# Patient Record
Sex: FEMALE | Race: WHITE | NOT HISPANIC OR LATINO
[De-identification: names, ages, dates, MRNs, and addresses within clinical notes are randomized per-mention and may not be internally consistent; named-entity substitution may affect disease eponyms.]

---

## 2018-08-27 ENCOUNTER — CONSULTATION (OUTPATIENT)
Age: 63
End: 2018-08-27

## 2018-08-27 ASSESSMENT — TONOMETRY
OD_IOP_MMHG: 16
OS_IOP_MMHG: 15

## 2018-08-27 ASSESSMENT — VISUAL ACUITY
OS_PH: 20/40
OD_PH: 20/30-2
OD_SC: 20/60-2
OS_SC: 20/60

## 2018-10-10 NOTE — PATIENT DISCUSSION
Poorly functioning levator muscle left upper eyelid. Patient denies congenital ptosis. Patient and daughter aware of possible post-operative exophthalmos and exposure. Patient to proceed with surgery at her convenience.

## 2018-10-10 NOTE — PATIENT DISCUSSION
[facility] Visual Field 36 point screen: I have reviewed the visual fields both taped and untaped on this patient which demonstrate significant obstruction of the patient's peripheral visual field on the left eye.

## 2018-10-10 NOTE — PATIENT DISCUSSION
PHOTOGRAPHS: I have reviewed the external ocular photographs of this patient which show the following: significant ptosis of the left upper eyelid.

## 2018-12-13 NOTE — PATIENT DISCUSSION
Also, please do not hesitate to call us if you have any concerns not addressed by this information. Please call [phone] and we will do everything we can to help you during this period.

## 2019-02-14 NOTE — PATIENT DISCUSSION
Discussed option of sling procedure to left upper eyelid with the patient today. The patient will discuss with her family and follow up with  with one of her family members to discuss proceeding with the procedure.

## 2020-01-24 NOTE — PATIENT DISCUSSION
PHOTOGRAPHS: I have reviewed the external ocular photographs of this patient which show the following: significant entropion of the left lower eyelid.

## 2020-01-24 NOTE — PATIENT DISCUSSION
CICATRICIAL ENTROPION, LLL: RECOMMEND ENTROPION REPAIR W/ EXCISION OF ANTERIOR LAMELLA AND TIGHTENING OF LATERAL CANTHUS, LLL.

## 2020-02-25 NOTE — PATIENT DISCUSSION
Also, please do not hesitate to call us if you have any concerns not addressed by this information. Please call [phone] and we will do everything we can to help you during this period.

## 2021-10-18 ENCOUNTER — PREPPED CHART (OUTPATIENT)
Dept: URBAN - METROPOLITAN AREA CLINIC 21 | Facility: CLINIC | Age: 66
End: 2021-10-18

## 2021-10-18 NOTE — PATIENT DISCUSSION
Start: * OMEGA 3'S HPD DAILY /* OLOPATADINE ONE DROP TO BOTH EYES DAILY / * RETAINE ONE DROP TO EACH EYE 2 TIMES PER DAY / * OASIS TEARS ONE DROP TO EACH EYE 2 TIMES A DAY IF NEEDED/ * LID SCRUBS USE DAILY IN BOTH EYES/ * WARM COMPRESSES DAILY BEFORE LID SCRUBS.

## 2022-05-09 ENCOUNTER — FOLLOW UP (OUTPATIENT)
Dept: URBAN - METROPOLITAN AREA CLINIC 21 | Facility: CLINIC | Age: 67
End: 2022-05-09

## 2022-05-09 DIAGNOSIS — H35.373: ICD-10-CM

## 2022-05-09 DIAGNOSIS — H16.223: ICD-10-CM

## 2022-05-09 DIAGNOSIS — D49.2: ICD-10-CM

## 2022-05-09 PROCEDURE — 92014 COMPRE OPH EXAM EST PT 1/>: CPT

## 2022-05-09 PROCEDURE — 92134 CPTRZ OPH DX IMG PST SGM RTA: CPT

## 2022-05-09 PROCEDURE — 92285 EXTERNAL OCULAR PHOTOGRAPHY: CPT

## 2022-05-09 ASSESSMENT — TONOMETRY
OD_IOP_MMHG: 11
OS_IOP_MMHG: 11

## 2022-05-09 ASSESSMENT — KERATOMETRY
OS_K2POWER_DIOPTERS: 44.75
OD_AXISANGLE_DEGREES: 14
OD_K2POWER_DIOPTERS: 44.00
OD_AXISANGLE2_DEGREES: 104
OS_AXISANGLE2_DEGREES: 53
OS_K1POWER_DIOPTERS: 44.25
OS_AXISANGLE_DEGREES: 143
OD_K1POWER_DIOPTERS: 43.50

## 2022-05-09 ASSESSMENT — VISUAL ACUITY
OS_SC: 20/20
OD_SC: 20/20
OU_SC: J1

## 2022-05-23 ENCOUNTER — ESTABLISHED PATIENT (OUTPATIENT)
Dept: URBAN - METROPOLITAN AREA CLINIC 21 | Facility: CLINIC | Age: 67
End: 2022-05-23

## 2022-05-23 DIAGNOSIS — H16.223: ICD-10-CM

## 2022-05-23 DIAGNOSIS — D49.2: ICD-10-CM

## 2022-05-23 DIAGNOSIS — H35.373: ICD-10-CM

## 2022-05-23 PROCEDURE — 11102 TANGNTL BX SKIN SINGLE LES: CPT

## 2022-05-23 PROCEDURE — 99213 OFFICE O/P EST LOW 20 MIN: CPT

## 2022-05-23 ASSESSMENT — TONOMETRY
OD_IOP_MMHG: 12
OS_IOP_MMHG: 11

## 2022-05-23 ASSESSMENT — KERATOMETRY
OS_K2POWER_DIOPTERS: 44.75
OS_AXISANGLE2_DEGREES: 53
OS_K1POWER_DIOPTERS: 44.25
OS_AXISANGLE_DEGREES: 143
OD_AXISANGLE_DEGREES: 14
OD_AXISANGLE2_DEGREES: 104
OD_K2POWER_DIOPTERS: 44.00
OD_K1POWER_DIOPTERS: 43.50

## 2022-05-23 ASSESSMENT — VISUAL ACUITY
OD_SC: 20/20-1
OS_SC: 20/20-2

## 2022-05-23 NOTE — PROCEDURE NOTE: CLINICAL
PROCEDURE NOTE: Tangential Biopsy of Skin, Single Lesion Right Lower Lid. Diagnosis: Eyelid Lesion, Uncertain Behavior. Prep: Alcohol Prep/Wipe. Prior to treatment, the risks/benefits/alternatives were discussed. The patient wished to proceed with procedure. A time out to confirm the patient, site, and procedure has been achieved. The site has been marked. Lesion was excised using a dermablade and placed in formaline to be sent for histopathology. The wound was cauterized to achieve hemostasis. Erythromycin ointment was placed on the wound. Patient tolerated procedure well. There were no complications. Post procedure instructions were given. RLL- SENT TO PATH.

## 2022-05-23 NOTE — PATIENT DISCUSSION
The risks, benefits, and alternatives to surgery were discussed. The patient elects to proceed with surgery- SENT TO PATH.

## 2022-06-16 ENCOUNTER — ESTABLISHED PATIENT (OUTPATIENT)
Dept: URBAN - METROPOLITAN AREA CLINIC 21 | Facility: CLINIC | Age: 67
End: 2022-06-16

## 2022-06-16 DIAGNOSIS — H16.223: ICD-10-CM

## 2022-06-16 DIAGNOSIS — H35.373: ICD-10-CM

## 2022-06-16 PROCEDURE — 92285 EXTERNAL OCULAR PHOTOGRAPHY: CPT

## 2022-06-16 PROCEDURE — 99213 OFFICE O/P EST LOW 20 MIN: CPT

## 2022-06-16 ASSESSMENT — KERATOMETRY
OD_AXISANGLE_DEGREES: 14
OS_AXISANGLE_DEGREES: 143
OS_K1POWER_DIOPTERS: 44.25
OD_K1POWER_DIOPTERS: 43.50
OD_AXISANGLE2_DEGREES: 104
OS_K2POWER_DIOPTERS: 44.75
OS_AXISANGLE2_DEGREES: 53
OD_K2POWER_DIOPTERS: 44.00

## 2022-06-16 ASSESSMENT — TONOMETRY
OS_IOP_MMHG: 8
OD_IOP_MMHG: 9

## 2022-06-16 ASSESSMENT — VISUAL ACUITY
OS_SC: 20/25-2
OD_SC: 20/20-2

## 2022-06-16 NOTE — PATIENT DISCUSSION
PATH RESULT:   SKIN, RIGHT LOWER EYELID, EXCISION: MACULAR SEBORRHEIC KERATOSIS, EXTENDING TO THE EDGE OF THE SPECIMEN.

## 2023-07-11 ENCOUNTER — ESTABLISHED PATIENT (OUTPATIENT)
Dept: URBAN - METROPOLITAN AREA CLINIC 21 | Facility: CLINIC | Age: 68
End: 2023-07-11

## 2023-07-11 DIAGNOSIS — H16.223: ICD-10-CM

## 2023-07-11 PROCEDURE — 99212 OFFICE O/P EST SF 10 MIN: CPT

## 2023-07-11 ASSESSMENT — KERATOMETRY
OD_K2POWER_DIOPTERS: 44.00
OD_AXISANGLE_DEGREES: 14
OS_K2POWER_DIOPTERS: 44.75
OS_AXISANGLE_DEGREES: 143
OD_AXISANGLE2_DEGREES: 104
OS_K1POWER_DIOPTERS: 44.25
OD_K1POWER_DIOPTERS: 43.50
OS_AXISANGLE2_DEGREES: 53

## 2023-07-11 ASSESSMENT — VISUAL ACUITY
OS_SC: 20/25
OD_SC: 20/25

## 2023-07-11 ASSESSMENT — TONOMETRY
OD_IOP_MMHG: 10
OS_IOP_MMHG: 10

## 2023-10-31 ENCOUNTER — ESTABLISHED PATIENT (OUTPATIENT)
Dept: URBAN - METROPOLITAN AREA CLINIC 21 | Facility: CLINIC | Age: 68
End: 2023-10-31

## 2023-10-31 DIAGNOSIS — H35.373: ICD-10-CM

## 2023-10-31 PROCEDURE — 92134 CPTRZ OPH DX IMG PST SGM RTA: CPT

## 2023-10-31 PROCEDURE — 99214 OFFICE O/P EST MOD 30 MIN: CPT

## 2023-10-31 ASSESSMENT — KERATOMETRY
OS_AXISANGLE2_DEGREES: 53
OS_K2POWER_DIOPTERS: 44.75
OD_AXISANGLE_DEGREES: 14
OD_K1POWER_DIOPTERS: 43.50
OD_AXISANGLE2_DEGREES: 104
OS_AXISANGLE_DEGREES: 143
OD_K2POWER_DIOPTERS: 44.00
OS_K1POWER_DIOPTERS: 44.25

## 2023-10-31 ASSESSMENT — TONOMETRY
OS_IOP_MMHG: 9
OD_IOP_MMHG: 11

## 2023-10-31 ASSESSMENT — VISUAL ACUITY
OD_SC: 20/20-2
OU_SC: 20/20-1
OS_SC: 20/20-1

## 2025-01-08 ENCOUNTER — TELEPHONE ENCOUNTER (OUTPATIENT)
Dept: URBAN - METROPOLITAN AREA CLINIC 113 | Facility: CLINIC | Age: 70
End: 2025-01-08

## 2025-01-28 ENCOUNTER — COMPREHENSIVE EXAM (OUTPATIENT)
Age: 70
End: 2025-01-28

## 2025-01-28 DIAGNOSIS — H35.373: ICD-10-CM

## 2025-01-28 DIAGNOSIS — Z96.1: ICD-10-CM

## 2025-01-28 DIAGNOSIS — H16.223: ICD-10-CM

## 2025-01-28 PROCEDURE — 92014 COMPRE OPH EXAM EST PT 1/>: CPT
